# Patient Record
Sex: MALE | Race: OTHER | HISPANIC OR LATINO | URBAN - METROPOLITAN AREA
[De-identification: names, ages, dates, MRNs, and addresses within clinical notes are randomized per-mention and may not be internally consistent; named-entity substitution may affect disease eponyms.]

---

## 2024-10-15 ENCOUNTER — EMERGENCY (EMERGENCY)
Facility: HOSPITAL | Age: 39
LOS: 1 days | Discharge: HOME | End: 2024-10-15
Attending: STUDENT IN AN ORGANIZED HEALTH CARE EDUCATION/TRAINING PROGRAM | Admitting: STUDENT IN AN ORGANIZED HEALTH CARE EDUCATION/TRAINING PROGRAM
Payer: SELF-PAY

## 2024-10-15 VITALS
DIASTOLIC BLOOD PRESSURE: 75 MMHG | WEIGHT: 149.91 LBS | SYSTOLIC BLOOD PRESSURE: 125 MMHG | HEART RATE: 68 BPM | TEMPERATURE: 98 F | HEIGHT: 68 IN | OXYGEN SATURATION: 98 % | RESPIRATION RATE: 18 BRPM

## 2024-10-15 RX ORDER — LIDOCAINE HCL 20 MG/ML
10 AMPUL (ML) INJECTION ONCE
Refills: 0 | Status: ACTIVE | OUTPATIENT
Start: 2024-10-15 | End: 2024-10-15

## 2024-10-15 RX ORDER — ACETAMINOPHEN 325 MG
975 TABLET ORAL ONCE
Refills: 0 | Status: COMPLETED | OUTPATIENT
Start: 2024-10-15 | End: 2024-10-15

## 2024-10-15 RX ORDER — TETANUS TOXOID, REDUCED DIPHTHERIA TOXOID AND ACELLULAR PERTUSSIS VACCINE, ADSORBED 5; 2.5; 8; 8; 2.5 [IU]/.5ML; [IU]/.5ML; UG/.5ML; UG/.5ML; UG/.5ML
0.5 SUSPENSION INTRAMUSCULAR ONCE
Refills: 0 | Status: COMPLETED | OUTPATIENT
Start: 2024-10-15 | End: 2024-10-15

## 2024-10-15 RX ADMIN — TETANUS TOXOID, REDUCED DIPHTHERIA TOXOID AND ACELLULAR PERTUSSIS VACCINE, ADSORBED 0.5 MILLILITER(S): 5; 2.5; 8; 8; 2.5 SUSPENSION INTRAMUSCULAR at 20:20

## 2024-10-15 RX ADMIN — Medication 600 MILLIGRAM(S): at 20:43

## 2024-10-15 RX ADMIN — Medication 975 MILLIGRAM(S): at 20:43

## 2024-10-15 NOTE — ED ADULT NURSE NOTE - OBJECTIVE STATEMENT
Pt is a 38y/o M denies PMHx presenting to the ED w/ c/o of laceration to R-second finger following injury @ work w/ metal. Pt endorses pain @ site, bleeding controlled w/ pressure dressing, Unsure of last tetanus. Able to wiggle all fingers, denies numbness/tingling, fever/chills. Pt A/Ox3, speaking in clear/complete sentences. Respirations easy/even and unlabored on RA. Pt ambulates independently w/ steady gait. Pt resting comfortably in chair, no acute distress. Pending ED provider eval.

## 2024-10-15 NOTE — ED PROVIDER NOTE - NSFOLLOWUPINSTRUCTIONS_ED_ALL_ED_FT
Mantega el yeso puesto  Regrese a la pauline de urgencias en suraj semana o "urgent care' para quitarle el punto  Realice un seguimiento con [facility] en [place]

## 2024-10-15 NOTE — ED PROVIDER NOTE - CLINICAL SUMMARY MEDICAL DECISION MAKING FREE TEXT BOX
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   -Outpatient hand f/u

## 2024-10-15 NOTE — ED ADULT TRIAGE NOTE - CHIEF COMPLAINT QUOTE
Pt presents to ED from  C/O R ring finger lac with nail avulsion and r/o foreign body S/p work injury. Pt Afghan speaking, hospital  offered, Pt presents to ED from  C/O R ring finger lac with nail avulsion and r/o foreign body S/p work injury. Pt Beninese speaking, hospital  offered, UNK tetanus.  Pt denies tetanus from .

## 2024-10-15 NOTE — ED PROVIDER NOTE - PATIENT PORTAL LINK FT
You can access the FollowMyHealth Patient Portal offered by [facility] by registering at the following website: [email or web address]. By joining [employer]’s FollowMyHealth portal, you will also be able to view your health information using other applications (apps) compatible with our system.

## 2024-10-15 NOTE — ED PROVIDER NOTE - OBJECTIVE STATEMENT
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 Unsure of last Tdap. 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 Unsure of last Tdap.

## 2024-10-15 NOTE — ED ADULT NURSE NOTE - CHIEF COMPLAINT QUOTE
Pt presents to ED from  C/O R ring finger lac with nail avulsion and r/o foreign body S/p work injury. Pt Panamanian speaking, hospital  offered, UNK tetanus.  Pt denies tetanus from .

## 2024-10-15 NOTE — ED PROVIDER NOTE - PHYSICAL EXAMINATION
GENERAL: no acute distress; well-developed  HEAD:  Atraumatic, Normocephalic  EYES: EOMI, PERRLA, conjunctiva and sclera clear  ENT: MMM; oropharynx clear  NECK: Supple, No JVD  CHEST/LUNG: Clear to auscultation bilaterally; No wheeze  HEART: Regular rate and rhythm; No murmurs, rubs, or gallops  ABDOMEN: Soft, Nontender, Nondistended; Bowel sounds present  EXTREMITIES:  2+ Peripheral Pulses, avulsion to R third finger distal to nail bed.   PSYCH: AAOx3  NEUROLOGY: no focal motor or sensory deficits. 5/5 muscle strength in all extremities.   SKIN: No rashes or lesions

## 2024-10-17 ENCOUNTER — EMERGENCY (EMERGENCY)
Facility: HOSPITAL | Age: 39
LOS: 1 days | Discharge: HOME | End: 2024-10-17
Admitting: STUDENT IN AN ORGANIZED HEALTH CARE EDUCATION/TRAINING PROGRAM
Payer: SELF-PAY

## 2024-10-17 VITALS
HEART RATE: 74 BPM | OXYGEN SATURATION: 97 % | SYSTOLIC BLOOD PRESSURE: 126 MMHG | WEIGHT: 145.06 LBS | TEMPERATURE: 98 F | RESPIRATION RATE: 18 BRPM | DIASTOLIC BLOOD PRESSURE: 67 MMHG | HEIGHT: 68 IN

## 2024-10-17 NOTE — ED ADULT NURSE NOTE - NSFALLUNIVINTERV_ED_ALL_ED
Bed/Stretcher in lowest position, wheels locked, appropriate side rails in place/Call bell, personal items and telephone in reach/Instruct patient to call for assistance before getting out of bed/chair/stretcher/Non-slip footwear applied when patient is off stretcher/[place] to call system/Physically safe environment - no spills, clutter or unnecessary equipment/Purposeful proactive rounding/Room/bathroom lighting operational, light cord in reach

## 2024-10-17 NOTE — ED PROVIDER NOTE - PATIENT PORTAL LINK FT
You can access the FollowMyHealth Patient Portal offered by [facility] by registering at the following website: [email or web address]. By joining [employer]’s FollowMyHealth portal, you will also be able to view your health information using other applications (apps) compatible with our system.

## 2024-10-17 NOTE — ED PROVIDER NOTE - OBJECTIVE STATEMENT
38 yo m here for wound check. lac repaired to R 4th finger 2 days ago. Pt states he did not remove the dressing and is soaked with blood. Reports some pain. NO fever, chills.

## 2024-10-17 NOTE — ED ADULT NURSE NOTE - OBJECTIVE STATEMENT
The patient is a 39y Male complaining of wound check of 4th finger R hand. Pt reports was sent to ED by his boss because there is blood on his dressing. +pulses. Pt denies F/C, numbness, able to move finger.

## 2024-10-17 NOTE — ED ADULT TRIAGE NOTE - CHIEF COMPLAINT QUOTE
Pt presents to ED c/o wound check on the R 4th finger. Pt was here 1 day ago for laceration repair. Pt A&Ox4, NAD.

## 2024-10-17 NOTE — ED PROVIDER NOTE - NSFOLLOWUPINSTRUCTIONS_ED_ALL_ED_FT
Change dressing daily   Wash with soap and water  Return to ED in 8-10 days to have your stiches removed  Return to ED sooner for increased pain, fever, chills, swelling, redness, drainage

## 2024-10-17 NOTE — ED PROVIDER NOTE - CLINICAL SUMMARY MEDICAL DECISION MAKING FREE TEXT BOX
40 yo m here for wound check. lac repaired to R 4th finger 2 days ago. Pt states he did not remove the dressing and is soaked with blood. Reports some pain. NO fever, chills.R ring finger- skin macerated, suture in place, no active bleeding. Dressing changed and loosely wrapped. will return  in 8-10 days for suture removal

## 2024-10-17 NOTE — ED PROVIDER NOTE - PHYSICAL EXAMINATION
CONSTITUTIONAL: Well-appearing;  in no apparent distress.   HEAD: Normocephalic; atraumatic.   EYES: PERRL; EOM intact; conjunctiva and sclera clear  SKIN: R ring finger- skin macerated, suture in place, no active bleeding

## 2024-10-17 NOTE — ED ADULT NURSE NOTE - BREATHING, MLM
Crash cart to room, pads placed on pt, monitoring in progress.  Room cardiac monitoring in progress.  EKG at  for continuous monitoring.  Adenosine obtained.  ERP notified.  Pt aware of pending procedure.     Spontaneous, unlabored and symmetrical
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
## 2024-10-18 DIAGNOSIS — S69.91XA UNSPECIFIED INJURY OF RIGHT WRIST, HAND AND FINGER(S), INITIAL ENCOUNTER: ICD-10-CM
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
## 2024-10-18 DIAGNOSIS — S62.634A DISPLACED FRACTURE OF DISTAL PHALANX OF RIGHT RING FINGER, INITIAL ENCOUNTER FOR CLOSED FRACTURE: ICD-10-CM

## 2024-10-18 DIAGNOSIS — S61.214A LACERATION WITHOUT FOREIGN BODY OF RIGHT RING FINGER WITHOUT DAMAGE TO NAIL, INITIAL ENCOUNTER: ICD-10-CM
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
## 2024-10-18 DIAGNOSIS — Z23 ENCOUNTER FOR IMMUNIZATION: ICD-10-CM

## 2024-10-21 DIAGNOSIS — S61.214D LACERATION WITHOUT FOREIGN BODY OF RIGHT RING FINGER WITHOUT DAMAGE TO NAIL, SUBSEQUENT ENCOUNTER: ICD-10-CM

## 2024-10-22 ENCOUNTER — EMERGENCY (EMERGENCY)
Facility: HOSPITAL | Age: 39
LOS: 1 days | Discharge: HOME | End: 2024-10-22
Attending: STUDENT IN AN ORGANIZED HEALTH CARE EDUCATION/TRAINING PROGRAM | Admitting: STUDENT IN AN ORGANIZED HEALTH CARE EDUCATION/TRAINING PROGRAM

## 2024-10-22 VITALS
DIASTOLIC BLOOD PRESSURE: 79 MMHG | TEMPERATURE: 98 F | OXYGEN SATURATION: 99 % | SYSTOLIC BLOOD PRESSURE: 137 MMHG | HEART RATE: 96 BPM | WEIGHT: 145.06 LBS | HEIGHT: 68 IN | RESPIRATION RATE: 16 BRPM

## 2024-10-22 NOTE — ED PROVIDER NOTE - CLINICAL SUMMARY MEDICAL DECISION MAKING FREE TEXT BOX
pt presenting for suture removal, sutures placed 1 week ago. wound healing well on exam. 1 suture removed. pt tolerated well. supportive care advised with PMD f/u as needed. stable for discharge. return precautions given

## 2024-10-22 NOTE — ED ADULT NURSE NOTE - OBJECTIVE STATEMENT
39M, presents for evaluation and removal of sutures. Patient dneies pain at this time or obvious signs of infection

## 2024-10-22 NOTE — ED PROVIDER NOTE - PATIENT PORTAL LINK FT
You can access the FollowMyHealth Patient Portal offered by [facility] by registering at the following website: [email or web address]. By joining [employer]’s FollowMyHealth portal, you will also be able to view your health information using other applications (apps) compatible with our system.

## 2024-10-22 NOTE — ED PROVIDER NOTE - PHYSICAL EXAMINATION
VITAL SIGNS: I have reviewed nursing notes and confirm.  CONSTITUTIONAL: Well appearing, in no acute distress.   SKIN:  warm and dry, no acute rash.   HEAD:  normocephalic, atraumatic.  EYES: EOM intact; conjunctiva and sclera clear.  ENT: No nasal discharge; airway clear.   NECK: Supple.  CARD: S1, S2, Regular rate and rhythm.   RESP:  Clear to auscultation b/l, no wheezes, rales or rhonchi.  ABD: Normal bowel sounds; soft; non-distended; non-tender; no guarding/ rebound.  EXT: 1 suture immediately distal to nail plate on left 4th digit, no wound dehiscence/erythema/purulent discharge noted.  Normal ROM. No peripheral edema. Pulses intact and equal b/l.  NEURO: Alert, oriented, grossly unremarkable

## 2024-10-22 NOTE — ED PROVIDER NOTE - IV ALTEPLASE INCLUSION HIDDEN
DM (diabetes mellitus)
show
DM (diabetes mellitus)

## 2024-10-22 NOTE — ED PROVIDER NOTE - OBJECTIVE STATEMENT
presenting for suture removal. pt sustained laceration and fracture to left 4th finger 1 week ago, had sutures placed. denies pain, redness, fever/chills, purulent discharge from wound. 1 suture was placed. ROS otherwise negative

## 2024-12-09 ENCOUNTER — EMERGENCY (EMERGENCY)
Facility: HOSPITAL | Age: 39
LOS: 1 days | Discharge: HOME | End: 2024-12-09
Admitting: EMERGENCY MEDICINE
Payer: SELF-PAY

## 2024-12-09 VITALS
HEIGHT: 68 IN | WEIGHT: 139.99 LBS | TEMPERATURE: 99 F | SYSTOLIC BLOOD PRESSURE: 121 MMHG | HEART RATE: 90 BPM | OXYGEN SATURATION: 96 % | DIASTOLIC BLOOD PRESSURE: 75 MMHG | RESPIRATION RATE: 18 BRPM

## 2024-12-09 PROCEDURE — 99283 EMERGENCY DEPT VISIT LOW MDM: CPT | Mod: 25

## 2024-12-09 PROCEDURE — 73140 X-RAY EXAM OF FINGER(S): CPT | Mod: 26,RT

## 2024-12-09 PROCEDURE — 73140 X-RAY EXAM OF FINGER(S): CPT

## 2024-12-09 PROCEDURE — 99284 EMERGENCY DEPT VISIT MOD MDM: CPT

## 2024-12-09 RX ORDER — CEPHALEXIN 500 MG
500 CAPSULE ORAL ONCE
Refills: 0 | Status: COMPLETED | OUTPATIENT
Start: 2024-12-09 | End: 2024-12-09

## 2024-12-09 RX ORDER — CEPHALEXIN 500 MG
1 CAPSULE ORAL
Qty: 28 | Refills: 0
Start: 2024-12-09 | End: 2024-12-15

## 2024-12-09 RX ADMIN — Medication 500 MILLIGRAM(S): at 10:31

## 2024-12-09 NOTE — ED PROVIDER NOTE - NSFOLLOWUPINSTRUCTIONS_ED_ALL_ED_FT
Please rest and remain well hydrated with plenty of fluids.  You can take motrin 600-800mg and tylenol 650mg every 3 hours, switching between the two for pain/bodyaches or fevers (>100.4F/>38C)    Please take full course of antibiotics as prescribed for early cellulitis of finger     Make sure to keep finger splint in place at all times.      Call to follow up with hand specialist within 2-3 days    You may call our referrals coordinator at [phone] Monday to Friday 11am-7pm for assistance with making an appointment    Cellulitis    Cellulitis is a skin infection caused by bacteria. This condition occurs most often in the arms and lower legs but can occur anywhere over the body. Symptoms include redness, swelling, warm skin, tenderness, and chills/fever. If you were prescribed an antibiotic medicine, take it as told by your health care provider. Do not stop taking the antibiotic even if you start to feel better.    SEEK IMMEDIATE MEDICAL CARE IF YOU HAVE ANY OF THE FOLLOWING SYMPTOMS: worsening fever, red streaks coming from affected area, vomiting or diarrhea, or dizziness/lightheadedness.

## 2024-12-09 NOTE — ED PROVIDER NOTE - CARE PROVIDER_API CALL
Sergio Gasca  Surgery of the Hand  [address]  [place] [address]  Phone: [phone]  Fax: [phone]  Follow Up Time:     Rishi Mack  Plastic Surgery  [address] [place] [id number]  Phone: [phone]  Fax: [phone]  Follow Up Time:

## 2024-12-09 NOTE — ED PROVIDER NOTE - OBJECTIVE STATEMENT
39 M RHD, p/w persistent R 4th finger pain s/p injury 2 mons ago.  pt has nailbed injury w/ lac rpr and distal phalanx fx.  pt wearing splint but reports worsening pain and swelling since returning to work.  never f/u with hand specialist.  reports subjective fevers 3 days ago.  denies HA, dizziness, uri sxs, abd pain, nvd, numbness/weakness, limited ROM digits, or new injuries.

## 2024-12-09 NOTE — ED PROVIDER NOTE - CLINICAL SUMMARY MEDICAL DECISION MAKING FREE TEXT BOX
39 M RHD, p/w persistent R 4th finger pain s/p injury 2 mons ago; wearing finger splint.  never f/u with hand.  returned to work and using hands to lift things but denies any recurrent injuries.  on exam vss, afbrile, well appearing, R hand: + swelling to distal 4th finger w/ healing wound to nail bed, no discharge or bleeding, mild erythema distal 4phalanx w/ overlying ttp, FROM all IP joints- pain w/ ROM 4th DIP but ROM intact, brisk cap refill, SILT, radial pulse 2+.  possible early cellulitis, no e/o abscess/paronychia or felon.  will rpt xray to eval fx and start abx.      xray w/ known distal phalanx fx. replaced splint, educated on RICE and importance of hand f/u this week and taking abx.  discussed strict return parameters

## 2024-12-09 NOTE — ED PROVIDER NOTE - PHYSICAL EXAMINATION
Gen: well appearing, no acute distress  Skin: warm/dry, no rash noted  Resp: breathing comfortably, speaking in full sentences, no dyspnea  R hand: + swelling to distal 4th finger w/ healing wound to nail bed, no discharge or bleeding, mild erythema distal 4phalanx w/ overlying ttp, FROM all IP joints- pain w/ ROM 4th DIP but ROM intact, brisk cap refill, SILT, radial pulse 2+  Neuro: alert/oriented, ambulatory

## 2024-12-09 NOTE — ED PROVIDER NOTE - CARE PROVIDERS DIRECT ADDRESSES
[email or web address]

## 2024-12-09 NOTE — ED PROVIDER NOTE - PATIENT PORTAL LINK FT
You can access the FollowMyHealth Patient Portal offered by [facility] by registering at the following website: [email or web address]. By joining [employer]’s FollowMyHealth portal, you will also be able to view your health information using other applications (apps) compatible with our system.
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
## 2024-12-11 DIAGNOSIS — M79.644 PAIN IN RIGHT FINGER(S): ICD-10-CM
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
## 2024-12-11 DIAGNOSIS — S62.634A DISPLACED FRACTURE OF DISTAL PHALANX OF RIGHT RING FINGER, INITIAL ENCOUNTER FOR CLOSED FRACTURE: ICD-10-CM

## 2025-03-07 NOTE — ED PROVIDER NOTE - PROGRESS NOTE DETAILS
Has not been taking Vyvanse due to concerns it was elevating blood pressure. However he has not noticed a difference in BP since stopping and his concentration and mood has been much worse. Will resume him at previous dosage of Vyvanse.  checked and is good. Follow up in 3 months or as needed.    Finger splinted and one suture placed in laceration. Covered with xeroform guaze.